# Patient Record
Sex: FEMALE | Race: WHITE | NOT HISPANIC OR LATINO | Employment: FULL TIME | URBAN - METROPOLITAN AREA
[De-identification: names, ages, dates, MRNs, and addresses within clinical notes are randomized per-mention and may not be internally consistent; named-entity substitution may affect disease eponyms.]

---

## 2022-05-18 ENCOUNTER — OFFICE VISIT (OUTPATIENT)
Dept: PSYCHIATRY | Facility: CLINIC | Age: 37
End: 2022-05-18
Payer: COMMERCIAL

## 2022-05-18 DIAGNOSIS — F90.0 ADHD (ATTENTION DEFICIT HYPERACTIVITY DISORDER), INATTENTIVE TYPE: Primary | ICD-10-CM

## 2022-05-18 DIAGNOSIS — F33.1 MDD (MAJOR DEPRESSIVE DISORDER), RECURRENT EPISODE, MODERATE: ICD-10-CM

## 2022-05-18 PROCEDURE — 90792 PR PSYCHIATRIC DIAGNOSTIC EVALUATION W/MEDICAL SERVICES: ICD-10-PCS | Mod: 95,,, | Performed by: PHYSICIAN ASSISTANT

## 2022-05-18 PROCEDURE — 90792 PSYCH DIAG EVAL W/MED SRVCS: CPT | Mod: 95,,, | Performed by: PHYSICIAN ASSISTANT

## 2022-05-18 RX ORDER — DEXTROAMPHETAMINE SACCHARATE, AMPHETAMINE ASPARTATE MONOHYDRATE, DEXTROAMPHETAMINE SULFATE AND AMPHETAMINE SULFATE 5; 5; 5; 5 MG/1; MG/1; MG/1; MG/1
20 CAPSULE, EXTENDED RELEASE ORAL EVERY MORNING
Qty: 30 CAPSULE | Refills: 0 | Status: SHIPPED | OUTPATIENT
Start: 2022-05-18 | End: 2022-06-20

## 2022-05-18 RX ORDER — LEVONORGESTREL / ETHINYL ESTRADIOL 0.15-0.03
1 KIT ORAL DAILY
COMMUNITY
Start: 2022-03-28

## 2022-05-18 NOTE — PROGRESS NOTES
Outpatient Psychiatry Initial Visit (MD/JADON)    5/18/2022    Hailey Avendano, a 36 y.o. female, presenting for initial evaluation visit. Met with patient.    Reason for Encounter: self-referral. Patient complains of   Chief Complaint   Patient presents with    Depression     Hx ADHD         History of Present Illness:    SUBJECTIVE:   Psych Interview 05/18/2022:   Hailey Avendano is a 36 y.o. female with past psychiatric history of ADHD presented to for initial evaluation and treatment for depression and ADHD.    Previously seeing Dr. Jas Lema, last seen in 2021. Was previously on adderall IR 15mg bid. Saw him for about 10 years.    Took zoloft over 10 years ago, not effective. States being on adderall previously has beneficial for depressive symptoms. Stopped after pregnancy, now wants to restart stimulant medication for ADHD symptoms.    Hx 0 prior psychiatric hospitalizations. Hx 0 suicide attempts. Pt denies hx self harm. Pt denies hx hallucinations.     Pt denies hx trauma. Denies physical/sexual abuse. Pt denies symptoms including nightmares, hypervigilance, flashbacks, avoidance behaviors, and disassociation.    Pt procrastination, poor organizational skills, poor focus, lack of motivation to get things done.      with 2 kids, works for home as an .    Pt is not currently taking any medications    Patients mood is steady, affect appears mood congruent. Linear and logical, friendly and cooperative, good eye contact.    Reports sleeping 8 hrs per night, and normal appetite.     Denies SI/HI/AVH. Denies side effects of medications.    PHQ 9 score of 11, showing moderate depressive symptoms. Discussed with patient that at this time will restart stimulant medication and see if depressive symptoms improve as they did in the past. Will consider antidepressant at future visit if significant depressive symptoms remain. Pt reports understanding and agreeable to plan.    The patient complained  of depressed mood with lethargy, decreased appetite , insomnia, anhedonia, apathy, worsening self-esteem, guilt, decreased concentration and ability to make decisions. Denies suicidal ideations.    Pt denies hx symptoms/episodes of donald.    Denies recreational drug use. Pt reports 2 drinks per week, denies tobacco use.      Review Of Systems:     Review of Systems   Constitutional: Negative for fever.   HENT: Negative for sore throat.    Eyes: Negative for photophobia.   Respiratory: Negative for cough.    Cardiovascular: Negative for chest pain and palpitations.   Gastrointestinal: Negative for abdominal pain.   Genitourinary: Negative for dysuria.   Musculoskeletal: Negative for myalgias.   Skin: Negative for rash.   Neurological: Negative for dizziness.   Endo/Heme/Allergies: Does not bruise/bleed easily.   Psychiatric/Behavioral: Positive for depression. Negative for hallucinations, memory loss, substance abuse and suicidal ideas. The patient is not nervous/anxious and does not have insomnia.           Psychiatric Review Of Systems - Is patient experiencing or having changes in:  sleep: yes  appetite: no  weight: no  energy/anergy: yes  interest/pleasure/anhedonia: yes  somatic symptoms: no  libido: no  anxiety/panic: no  guilty/hopelessness: no  concentration: no  S.I.B.s/risky behavior: no  Irritability: no  Racing thoughts: no  Impulsive behaviors: no  Paranoia: no  AVH: no    OBJECTIVE     Past Psychiatric History:   Previous Psychiatric Hospitalizations: NO  Previous Medication Trials: YES: zoloft, adderall     History of psychotherapy: YES     Previous Suicide Attempts: NO  History of Violence:  NO  History of physical/sexual abuse: NO  Outpatient psychiatric provider(past): YES: Dr gilmar rudolph       Substance Abuse History:   Tobacco: NO  Alcohol: YES: 2 drinks per week     Illicit Substances: NO  Detox/Rehab: NO    Neurological History:   Seizures: NO  Head trauma: NO    Family Psychiatric History:  No  Social History:  Developmental/Childhood:Achieved all developmental milestones timely  *Education:Bachelor's Degree  Employment Status/Finances:Employed   Relationship Status/Sexual Orientation: : Relationship intact  Children: 2  Housing Status: Home    history:  NO  Access to gun: YES: handgun for home defense.       Legal History:   Past Charges/Incarcerations:  No      Past Medical/Surgical History:   History reviewed. No pertinent past medical history.  History reviewed. No pertinent surgical history.      Current Medications:   Medication List with Changes/Refills   New Medications    DEXTROAMPHETAMINE-AMPHETAMINE (ADDERALL XR) 20 MG 24 HR CAPSULE    Take 1 capsule (20 mg total) by mouth every morning.   Current Medications    JOLESSA 0.15 MG-30 MCG (91) PER TABLET    Take 1 tablet by mouth once daily.   Discontinued Medications    DROSPIR-ETH ESTRA-LEVOMEFOL CA (BEYAZ) 3-0.02-0.451 MG (24) TAB    Take by mouth. 1.5 Tablet Oral Every day       Allergies:   Review of patient's allergies indicates:  No Known Allergies      Vitals   There were no vitals filed for this visit.     Labs/Imaging/Studies:   No results found for this or any previous visit (from the past 48 hour(s)).   No results found for: PHENYTOIN, PHENOBARB, VALPROATE, CBMZ      Nutritional Screening: Considering the patient's height and weight, medications, medical history and preferences, should a referral be made to the dietitian? no    Constitutional  Vitals:  Most recent vital signs, dated greater than 90 days prior to this appointment, were reviewed.    There were no vitals filed for this visit.     General:  unremarkable, age appropriate     Musculoskeletal  Muscle Strength/Tone:  not examined   Gait & Station:  Not examined       Psychiatric Mental Status Exam:  Arousal: alert  Sensorium/Orientation: oriented to grossly intact  Behavior/Cooperation: normal, friendly and cooperative, eye contact normal   Speech: normal  tone, normal rate, normal pitch, normal volume  Language: grossly intact  Mood: steady  Affect: congruent and appropriate  Thought Process: normal and logical  Thought Content:   Auditory hallucinations: NO  Visual hallucinations: NO  Paranoia: NO  Delusions:  NO  Suicidal ideation: NO  Homicidal ideation: NO  Attention/Concentration:  intact  Memory:    Recent: [facility] Recent Memory: WNL   Remote: [facility] Remote Memory: WNL , past events, as relates history  Fund of Knowledge: Aware of current events and Intact   Intelligence: [facility] Intelligence: Above Average, based on history, based on vocabulary, syntax, grammar and content  Insight: {[facility] insight: Good, understanding severity of illness/history of present illness  Judgment: [facility] Judgement: Good, per patient's behavior/history of present illness      Relevant Elements of Neurological Exam: normal gait        Laboratory Data  No visits with results within 1 Month(s) from this visit.   Latest known visit with results is:   No results found for any previous visit.         Medications  Outpatient Encounter Medications as of 5/18/2022   Medication Sig Dispense Refill    dextroamphetamine-amphetamine (ADDERALL XR) 20 MG 24 hr capsule Take 1 capsule (20 mg total) by mouth every morning. 30 capsule 0    JOLESSA 0.15 mg-30 mcg (91) per tablet Take 1 tablet by mouth once daily.      [DISCONTINUED] drospir-eth estra-levomefol Ca (BEYAZ) 3-0.02-0.451 mg (24) Tab Take by mouth. 1.5 Tablet Oral Every day       No facility-administered encounter medications on file as of 5/18/2022.           Assessment / Plan:     Diagnosis:      ICD-10-CM ICD-9-CM   1. ADHD (attention deficit hyperactivity disorder), inattentive type  F90.0 314.00   2. MDD (major depressive disorder), recurrent episode, moderate  F33.1 296.32       Strengths and Liabilities: Strength: Patient accepts guidance/feedback, Strength: Patient is expressive/articulate., Strength: Patient is intelligent., Strength:  Patient is motivated for change., Strength: Patient is physically healthy., Strength: Patient has positive support network., Strength: Patient has reasonable judgment., Strength: Patient is stable.    Treatment Goals:  Specify outcomes written in observable, behavioral terms:   Depression: increasing energy, increasing interest in usual activities, increasing motivation, reducing fatigue and reducing negative automatic thoughts    Treatment Plan/Recommendations:   · Medication Management: Continue current medications. The risks and benefits of medication were discussed with the patient.  · The treatment plan and follow up plan were reviewed with the patient.  -start adderall XR 20mg daily    Discussed with patient that at this time will restart stimulant medication and see if depressive symptoms improve as they did in the past. Will consider antidepressant at future visit if significant depressive symptoms remain. Pt reports understanding and agreeable to plan.    Return to Clinic: as scheduled      Total face to face time: 45 min  Total time (chart review, patient contact, documentation): 52 min    Antonio Javier PA-C      *This note has been prepared using a combination of a dictation device and typing.  It has been checked for errors but some errors may still exist within the note as a result of speech recognition errors and/or typographical errors.

## 2022-06-20 ENCOUNTER — OFFICE VISIT (OUTPATIENT)
Dept: PSYCHIATRY | Facility: CLINIC | Age: 37
End: 2022-06-20
Payer: COMMERCIAL

## 2022-06-20 DIAGNOSIS — F33.1 MDD (MAJOR DEPRESSIVE DISORDER), RECURRENT EPISODE, MODERATE: ICD-10-CM

## 2022-06-20 DIAGNOSIS — F90.0 ADHD (ATTENTION DEFICIT HYPERACTIVITY DISORDER), INATTENTIVE TYPE: Primary | ICD-10-CM

## 2022-06-20 PROCEDURE — 99214 PR OFFICE/OUTPT VISIT, EST, LEVL IV, 30-39 MIN: ICD-10-PCS | Mod: 95,,, | Performed by: PHYSICIAN ASSISTANT

## 2022-06-20 PROCEDURE — 99214 OFFICE O/P EST MOD 30 MIN: CPT | Mod: 95,,, | Performed by: PHYSICIAN ASSISTANT

## 2022-06-20 RX ORDER — DEXTROAMPHETAMINE SACCHARATE, AMPHETAMINE ASPARTATE, DEXTROAMPHETAMINE SULFATE AND AMPHETAMINE SULFATE 3.75; 3.75; 3.75; 3.75 MG/1; MG/1; MG/1; MG/1
15 TABLET ORAL 2 TIMES DAILY
Qty: 60 TABLET | Refills: 0 | Status: SHIPPED | OUTPATIENT
Start: 2022-06-20 | End: 2022-07-19 | Stop reason: SDUPTHER

## 2022-06-20 NOTE — PROGRESS NOTES
"The patient location is: [place], [place]  The chief complaint leading to consultation is: ADHD, depression f/u    Visit type: audiovisual    Face to Face time with patient: 15  20 minutes of total time spent on the encounter, which includes face to face time and non-face to face time preparing to see the patient (eg, review of tests), Obtaining and/or reviewing separately obtained history, Documenting clinical information in the electronic or other health record, Independently interpreting results (not separately reported) and communicating results to the patient/family/caregiver, or Care coordination (not separately reported).         Each patient to whom he or she provides medical services by telemedicine is:  (1) informed of the relationship between the physician and patient and the respective role of any other health care provider with respect to management of the patient; and (2) notified that he or she may decline to receive medical services by telemedicine and may withdraw from such care at any time.    Notes:       Outpatient Psychiatry Follow-Up Visit (MD/JADON)    6/20/2022    Clinical Status of Patient:  Outpatient (Ambulatory)    Chief Complaint:  Hailey Avendano is a 36 y.o. female who presents today for follow-up of depression and attention problems.  Met with patient.      Interval History and Content of Current Session:  Interim Events/Subjective Report/Content of Current Session:    Pt reports today: "i'd like to try the adderall instant release instead." Pt reports not much benefit for ADHD symptoms on XR and in past did well on IR 15mg bid by previous prescriber.    Discussed with pt will switch to adderall IR and reassess in 1 month. If depressive symptoms remain bothersome will consider adding cymbalta. Pt agrees with plan    Patients mood is steady, slightly depressive, affect appears mood congruent. Linear and logical, friendly and cooperative, good eye contact.    Denies SI/HI/AVH. Pt reports " sleeping well and normal appetite. Denies side effects of medications.    Pt reports taking medications as prescribed and behaving appropriately during interview today.      Prior visit:  Psych Interview 05/18/2022:   Hailey Avendano is a 36 y.o. female with past psychiatric history of ADHD presented to for initial evaluation and treatment for depression and ADHD.     Previously seeing Dr. Jas Lema, last seen in 2021. Was previously on adderall IR 15mg bid. Saw him for about 10 years.     Took zoloft over 10 years ago, not effective. States being on adderall previously has beneficial for depressive symptoms. Stopped after pregnancy, now wants to restart stimulant medication for ADHD symptoms.     Hx 0 prior psychiatric hospitalizations. Hx 0 suicide attempts. Pt denies hx self harm. Pt denies hx hallucinations.      Pt denies hx trauma. Denies physical/sexual abuse. Pt denies symptoms including nightmares, hypervigilance, flashbacks, avoidance behaviors, and disassociation.     Pt procrastination, poor organizational skills, poor focus, lack of motivation to get things done.       with 2 kids, works for home as an .     Pt is not currently taking any medications     Patients mood is steady, affect appears mood congruent. Linear and logical, friendly and cooperative, good eye contact.     Reports sleeping 8 hrs per night, and normal appetite.      Denies SI/HI/AVH. Denies side effects of medications.     PHQ 9 score of 11, showing moderate depressive symptoms. Discussed with patient that at this time will restart stimulant medication and see if depressive symptoms improve as they did in the past. Will consider antidepressant at future visit if significant depressive symptoms remain. Pt reports understanding and agreeable to plan.     The patient complained of depressed mood with lethargy, decreased appetite , insomnia, anhedonia, apathy, worsening self-esteem, guilt, decreased concentration  and ability to make decisions. Denies suicidal ideations.     Pt denies hx symptoms/episodes of donald.     Denies recreational drug use. Pt reports 2 drinks per week, denies tobacco use.            Review of Systems     Psychiatric Review Of Systems - Is patient experiencing or having changes in:  sleep: no  appetite: no  weight: no  energy/anergy: yes  interest/pleasure/anhedonia: yes  somatic symptoms: no  libido: no  anxiety/panic: no  guilty/hopelessness: no  concentration: yes  S.I.B.s/risky behavior: no  Irritability: no  Racing thoughts: no  Impulsive behaviors: no  Paranoia: no  AVH: no      Past Medical, Family and Social History: The patient's past medical, family and social history have been reviewed and updated as appropriate within the electronic medical record - see encounter notes.      Current Medications:   Medication List with Changes/Refills   Current Medications    DEXTROAMPHETAMINE-AMPHETAMINE (ADDERALL XR) 20 MG 24 HR CAPSULE    Take 1 capsule (20 mg total) by mouth every morning.    JOLESSA 0.15 MG-30 MCG (91) PER TABLET    Take 1 tablet by mouth once daily.         Allergies:   Review of patient's allergies indicates:  No Known Allergies      Vitals   There were no vitals filed for this visit.       Labs/Imaging/Studies:   No results found for this or any previous visit (from the past 48 hour(s)).   No results found for: PHENYTOIN, PHENOBARB, VALPROATE, CBMZ    Compliance: yes    Side effects: None    Risk Parameters:  Patient reports no suicidal ideation  Patient reports no homicidal ideation  Patient reports no self-injurious behavior  Patient reports no violent behavior    Exam (detailed: at least 9 elements; comprehensive: all 15 elements)   Constitutional  Vitals:  Most recent vital signs, dated greater than 90 days prior to this appointment, were reviewed.   There were no vitals filed for this visit.     General:  unremarkable, age appropriate     Musculoskeletal  Muscle Strength/Tone:   not examined   Gait & Station:  non-ataxic     Psychiatric  Speech:  no latency; no press   Mood & Affect:  steady, slightly depressive  congruent and appropriate   Thought Process:  normal and logical   Associations:  intact   Thought Content:  normal, no suicidality, no homicidality, delusions, or paranoia   Insight:  intact, has awareness of illness   Judgement: behavior is adequate to circumstances   Orientation:  grossly intact   Memory: intact for content of interview   Language: grossly intact   Attention Span & Concentration:  able to focus   Fund of Knowledge:  intact and appropriate to age and level of education     Assessment and Diagnosis   Status/Progress: Based on the examination today, the patient's problem(s) is/are adequately but not ideally controlled.  New problems have not been presented today.   Co-morbidities, Diagnostic uncertainty and Lack of compliance are not complicating management of the primary condition.  There are no active rule-out diagnoses for this patient at this time.     General Impression:      ICD-10-CM ICD-9-CM   1. ADHD (attention deficit hyperactivity disorder), inattentive type  F90.0 314.00   2. MDD (major depressive disorder), recurrent episode, moderate  F33.1 296.32       Intervention/Counseling/Treatment Plan   · Medication Management: Continue current medications. The risks and benefits of medication were discussed with the patient.  -stop adderall xr  -start adderall ir 15mg bid    - will reassess in 1 month    Return to Clinic: 1 month        Antonio Javier PA-C      Total face to face time: 15 min  Total time (chart review, patient contact, documentation): 20 min      *This note has been prepared using a combination of a dictation device and typing.  It has been checked for errors but some errors may still exist within the note as a result of speech recognition errors and/or typographical errors.

## 2022-07-19 ENCOUNTER — OFFICE VISIT (OUTPATIENT)
Dept: PSYCHIATRY | Facility: CLINIC | Age: 37
End: 2022-07-19
Payer: COMMERCIAL

## 2022-07-19 VITALS
BODY MASS INDEX: 19.67 KG/M2 | WEIGHT: 116.38 LBS | DIASTOLIC BLOOD PRESSURE: 65 MMHG | SYSTOLIC BLOOD PRESSURE: 105 MMHG | HEART RATE: 83 BPM

## 2022-07-19 DIAGNOSIS — F90.0 ADHD (ATTENTION DEFICIT HYPERACTIVITY DISORDER), INATTENTIVE TYPE: Primary | ICD-10-CM

## 2022-07-19 DIAGNOSIS — F33.0 MDD (MAJOR DEPRESSIVE DISORDER), RECURRENT EPISODE, MILD: ICD-10-CM

## 2022-07-19 PROCEDURE — 99213 OFFICE O/P EST LOW 20 MIN: CPT | Mod: S$GLB,,, | Performed by: PHYSICIAN ASSISTANT

## 2022-07-19 PROCEDURE — 99999 PR PBB SHADOW E&M-EST. PATIENT-LVL II: CPT | Mod: PBBFAC,,, | Performed by: PHYSICIAN ASSISTANT

## 2022-07-19 PROCEDURE — 99213 PR OFFICE/OUTPT VISIT, EST, LEVL III, 20-29 MIN: ICD-10-PCS | Mod: S$GLB,,, | Performed by: PHYSICIAN ASSISTANT

## 2022-07-19 PROCEDURE — 99999 PR PBB SHADOW E&M-EST. PATIENT-LVL II: ICD-10-PCS | Mod: PBBFAC,,, | Performed by: PHYSICIAN ASSISTANT

## 2022-07-19 RX ORDER — DEXTROAMPHETAMINE SACCHARATE, AMPHETAMINE ASPARTATE, DEXTROAMPHETAMINE SULFATE AND AMPHETAMINE SULFATE 3.75; 3.75; 3.75; 3.75 MG/1; MG/1; MG/1; MG/1
15 TABLET ORAL 2 TIMES DAILY
Qty: 60 TABLET | Refills: 0 | Status: SHIPPED | OUTPATIENT
Start: 2022-07-19 | End: 2022-08-18 | Stop reason: SDUPTHER

## 2022-07-19 NOTE — PROGRESS NOTES
"Outpatient Psychiatry Follow-Up Visit (MD/JADON)    7/19/2022    Clinical Status of Patient:  Outpatient (Ambulatory)    Chief Complaint:  Hailey Avendano is a 36 y.o. female who presents today for follow-up of depression and attention problems.  Met with patient.      Interval History and Content of Current Session:  Interim Events/Subjective Report/Content of Current Session:    Pt reports today: "feeling good." Pt reports short acting adderall IR 15mg bid working much better than adderall XR. States that her depressive symptoms have significantly improved as well.    Patients mood is euthymic, affect appears mood congruent. Linear and logical, friendly and cooperative, good eye contact.    Denies SI/HI/AVH. Pt reports sleeping well and normal appetite. Denies side effects of medications.    Pt reports taking medications as prescribed and behaving appropriately during interview today.      Prior visit 6/20/22:     Pt reports today: "i'd like to try the adderall instant release instead." Pt reports not much benefit for ADHD symptoms on XR and in past did well on IR 15mg bid by previous prescriber.     Discussed with pt will switch to adderall IR and reassess in 1 month. If depressive symptoms remain bothersome will consider adding cymbalta. Pt agrees with plan     Patients mood is steady, slightly depressive, affect appears mood congruent. Linear and logical, friendly and cooperative, good eye contact.     Denies SI/HI/AVH. Pt reports sleeping well and normal appetite. Denies side effects of medications.     Pt reports taking medications as prescribed and behaving appropriately during interview today.             Review of Systems     Review of Systems   Constitutional: Negative for fever.   HENT: Negative for sore throat.    Eyes: Negative for photophobia.   Respiratory: Negative for cough.    Cardiovascular: Negative for chest pain and palpitations.   Gastrointestinal: Negative for abdominal pain.   Genitourinary: " Negative for dysuria.   Musculoskeletal: Negative for myalgias.   Skin: Negative for rash.   Neurological: Negative for dizziness.   Endo/Heme/Allergies: Does not bruise/bleed easily.       Psychiatric Review Of Systems - Is patient experiencing or having changes in:  sleep: no  appetite: no  weight: no  energy/anergy: no  interest/pleasure/anhedonia: no  somatic symptoms: no  libido: no  anxiety/panic: no  guilty/hopelessness: no  concentration: no  S.I.B.s/risky behavior: no  Irritability: no  Racing thoughts: no  Impulsive behaviors: no  Paranoia: no  AVH: no      Past Medical, Family and Social History: The patient's past medical, family and social history have been reviewed and updated as appropriate within the electronic medical record - see encounter notes.      Current Medications:   Medication List with Changes/Refills   Current Medications    JOLESSA 0.15 MG-30 MCG (91) PER TABLET    Take 1 tablet by mouth once daily.   Changed and/or Refilled Medications    Modified Medication Previous Medication    DEXTROAMPHETAMINE-AMPHETAMINE (ADDERALL) 15 MG TABLET dextroamphetamine-amphetamine (ADDERALL) 15 mg tablet       Take 1 tablet (15 mg total) by mouth 2 (two) times a day.    Take 1 tablet (15 mg total) by mouth 2 (two) times a day.         Allergies:   Review of patient's allergies indicates:  No Known Allergies      Vitals   Vitals:    07/19/22 0929   BP: 105/65   Pulse: 83          Labs/Imaging/Studies:   No results found for this or any previous visit (from the past 48 hour(s)).   No results found for: PHENYTOIN, PHENOBARB, VALPROATE, CBMZ    Compliance: yes    Side effects: None    Risk Parameters:  Patient reports no suicidal ideation  Patient reports no homicidal ideation  Patient reports no self-injurious behavior  Patient reports no violent behavior    Exam (detailed: at least 9 elements; comprehensive: all 15 elements)   Constitutional  Vitals:  Most recent vital signs, dated less than 90 days prior  to this appointment, were reviewed.   Vitals:    07/19/22 0929   BP: 105/65   Pulse: 83   Weight: 52.8 kg (116 lb 6.5 oz)        General:  unremarkable, age appropriate     Musculoskeletal  Muscle Strength/Tone:  not examined   Gait & Station:  non-ataxic     Psychiatric  Speech:  no latency; no press   Mood & Affect:  euthymic  congruent and appropriate   Thought Process:  normal and logical   Associations:  intact   Thought Content:  normal, no suicidality, no homicidality, delusions, or paranoia   Insight:  intact, has awareness of illness   Judgement: behavior is adequate to circumstances   Orientation:  grossly intact   Memory: intact for content of interview   Language: grossly intact   Attention Span & Concentration:  able to focus   Fund of Knowledge:  intact and appropriate to age and level of education     Assessment and Diagnosis   Status/Progress: Based on the examination today, the patient's problem(s) is/are improved and well controlled.  New problems have not been presented today.   Co-morbidities, Diagnostic uncertainty and Lack of compliance are not complicating management of the primary condition.  There are no active rule-out diagnoses for this patient at this time.     General Impression:      ICD-10-CM ICD-9-CM   1. ADHD (attention deficit hyperactivity disorder), inattentive type  F90.0 314.00   2. MDD (major depressive disorder), recurrent episode, mild  F33.0 296.31       Intervention/Counseling/Treatment Plan   · Medication Management: Continue current medications. The risks and benefits of medication were discussed with the patient.  -adderall IR 15mg bid    Return to Clinic: as scheduled        Antonio Javier PA-C      Total face to face time: 16 min  Total time (chart review, patient contact, documentation): 22 min      *This note has been prepared using a combination of a dictation device and typing.  It has been checked for errors but some errors may still exist within the note as a result  of speech recognition errors and/or typographical errors.

## 2022-11-02 ENCOUNTER — PATIENT MESSAGE (OUTPATIENT)
Dept: PSYCHIATRY | Facility: CLINIC | Age: 37
End: 2022-11-02
Payer: COMMERCIAL

## 2022-11-03 DIAGNOSIS — F90.0 ADHD (ATTENTION DEFICIT HYPERACTIVITY DISORDER), INATTENTIVE TYPE: ICD-10-CM

## 2022-11-03 RX ORDER — DEXTROAMPHETAMINE SACCHARATE, AMPHETAMINE ASPARTATE, DEXTROAMPHETAMINE SULFATE AND AMPHETAMINE SULFATE 3.75; 3.75; 3.75; 3.75 MG/1; MG/1; MG/1; MG/1
15 TABLET ORAL 2 TIMES DAILY
Qty: 60 TABLET | Refills: 0 | Status: SHIPPED | OUTPATIENT
Start: 2022-11-03

## 2022-11-11 ENCOUNTER — PATIENT MESSAGE (OUTPATIENT)
Dept: PSYCHIATRY | Facility: CLINIC | Age: 37
End: 2022-11-11
Payer: COMMERCIAL

## 2022-11-15 ENCOUNTER — PATIENT MESSAGE (OUTPATIENT)
Dept: PSYCHIATRY | Facility: CLINIC | Age: 37
End: 2022-11-15
Payer: COMMERCIAL